# Patient Record
Sex: MALE | Race: WHITE | NOT HISPANIC OR LATINO | URBAN - METROPOLITAN AREA
[De-identification: names, ages, dates, MRNs, and addresses within clinical notes are randomized per-mention and may not be internally consistent; named-entity substitution may affect disease eponyms.]

---

## 2021-01-16 ENCOUNTER — INPATIENT (INPATIENT)
Facility: HOSPITAL | Age: 66
LOS: 0 days | Discharge: HOME | DRG: 314 | End: 2021-01-17
Attending: HOSPITALIST | Admitting: HOSPITALIST
Payer: MEDICARE

## 2021-01-16 VITALS
RESPIRATION RATE: 16 BRPM | WEIGHT: 272.05 LBS | SYSTOLIC BLOOD PRESSURE: 97 MMHG | TEMPERATURE: 98 F | HEIGHT: 72 IN | OXYGEN SATURATION: 97 % | HEART RATE: 55 BPM | DIASTOLIC BLOOD PRESSURE: 56 MMHG

## 2021-01-16 DIAGNOSIS — R00.1 BRADYCARDIA, UNSPECIFIED: ICD-10-CM

## 2021-01-16 LAB
ALBUMIN SERPL ELPH-MCNC: 3.9 G/DL — SIGNIFICANT CHANGE UP (ref 3.3–5.2)
ALP SERPL-CCNC: 32 U/L — LOW (ref 40–120)
ALT FLD-CCNC: 19 U/L — SIGNIFICANT CHANGE UP
ANION GAP SERPL CALC-SCNC: 16 MMOL/L — SIGNIFICANT CHANGE UP (ref 5–17)
AST SERPL-CCNC: 20 U/L — SIGNIFICANT CHANGE UP
BASOPHILS # BLD AUTO: 0.05 K/UL — SIGNIFICANT CHANGE UP (ref 0–0.2)
BASOPHILS NFR BLD AUTO: 0.7 % — SIGNIFICANT CHANGE UP (ref 0–2)
BILIRUB SERPL-MCNC: <0.2 MG/DL — LOW (ref 0.4–2)
BLD GP AB SCN SERPL QL: SIGNIFICANT CHANGE UP
BUN SERPL-MCNC: 157 MG/DL — HIGH (ref 8–20)
CALCIUM SERPL-MCNC: 8.5 MG/DL — LOW (ref 8.6–10.2)
CHLORIDE SERPL-SCNC: 98 MMOL/L — SIGNIFICANT CHANGE UP (ref 98–107)
CO2 SERPL-SCNC: 20 MMOL/L — LOW (ref 22–29)
CREAT SERPL-MCNC: 7.01 MG/DL — HIGH (ref 0.5–1.3)
EOSINOPHIL # BLD AUTO: 0.31 K/UL — SIGNIFICANT CHANGE UP (ref 0–0.5)
EOSINOPHIL NFR BLD AUTO: 4 % — SIGNIFICANT CHANGE UP (ref 0–6)
GLUCOSE BLDC GLUCOMTR-MCNC: 195 MG/DL — HIGH (ref 70–99)
GLUCOSE SERPL-MCNC: 245 MG/DL — HIGH (ref 70–99)
HCT VFR BLD CALC: 21.5 % — LOW (ref 39–50)
HGB BLD-MCNC: 6.8 G/DL — CRITICAL LOW (ref 13–17)
IMM GRANULOCYTES NFR BLD AUTO: 0.5 % — SIGNIFICANT CHANGE UP (ref 0–1.5)
LYMPHOCYTES # BLD AUTO: 0.68 K/UL — LOW (ref 1–3.3)
LYMPHOCYTES # BLD AUTO: 8.9 % — LOW (ref 13–44)
MCHC RBC-ENTMCNC: 27 PG — SIGNIFICANT CHANGE UP (ref 27–34)
MCHC RBC-ENTMCNC: 31.6 GM/DL — LOW (ref 32–36)
MCV RBC AUTO: 85.3 FL — SIGNIFICANT CHANGE UP (ref 80–100)
MONOCYTES # BLD AUTO: 0.44 K/UL — SIGNIFICANT CHANGE UP (ref 0–0.9)
MONOCYTES NFR BLD AUTO: 5.7 % — SIGNIFICANT CHANGE UP (ref 2–14)
NEUTROPHILS # BLD AUTO: 6.14 K/UL — SIGNIFICANT CHANGE UP (ref 1.8–7.4)
NEUTROPHILS NFR BLD AUTO: 80.2 % — HIGH (ref 43–77)
NT-PROBNP SERPL-SCNC: 599 PG/ML — HIGH (ref 0–300)
PLATELET # BLD AUTO: 257 K/UL — SIGNIFICANT CHANGE UP (ref 150–400)
POTASSIUM SERPL-MCNC: 5.2 MMOL/L — SIGNIFICANT CHANGE UP (ref 3.5–5.3)
POTASSIUM SERPL-SCNC: 5.2 MMOL/L — SIGNIFICANT CHANGE UP (ref 3.5–5.3)
PROT SERPL-MCNC: 6.1 G/DL — LOW (ref 6.6–8.7)
RBC # BLD: 2.52 M/UL — LOW (ref 4.2–5.8)
RBC # FLD: 14.9 % — HIGH (ref 10.3–14.5)
SODIUM SERPL-SCNC: 134 MMOL/L — LOW (ref 135–145)
TROPONIN T SERPL-MCNC: 0.14 NG/ML — HIGH (ref 0–0.06)
TROPONIN T SERPL-MCNC: 0.15 NG/ML — HIGH (ref 0–0.06)
WBC # BLD: 7.66 K/UL — SIGNIFICANT CHANGE UP (ref 3.8–10.5)
WBC # FLD AUTO: 7.66 K/UL — SIGNIFICANT CHANGE UP (ref 3.8–10.5)

## 2021-01-16 PROCEDURE — 99223 1ST HOSP IP/OBS HIGH 75: CPT

## 2021-01-16 PROCEDURE — 93010 ELECTROCARDIOGRAM REPORT: CPT

## 2021-01-16 PROCEDURE — 71045 X-RAY EXAM CHEST 1 VIEW: CPT | Mod: 26

## 2021-01-16 PROCEDURE — 99285 EMERGENCY DEPT VISIT HI MDM: CPT

## 2021-01-16 RX ORDER — GLUCAGON INJECTION, SOLUTION 0.5 MG/.1ML
1 INJECTION, SOLUTION SUBCUTANEOUS ONCE
Refills: 0 | Status: DISCONTINUED | OUTPATIENT
Start: 2021-01-16 | End: 2021-01-17

## 2021-01-16 RX ORDER — DEXTROSE 50 % IN WATER 50 %
15 SYRINGE (ML) INTRAVENOUS ONCE
Refills: 0 | Status: DISCONTINUED | OUTPATIENT
Start: 2021-01-16 | End: 2021-01-17

## 2021-01-16 RX ORDER — DEXTROSE 50 % IN WATER 50 %
12.5 SYRINGE (ML) INTRAVENOUS ONCE
Refills: 0 | Status: DISCONTINUED | OUTPATIENT
Start: 2021-01-16 | End: 2021-01-17

## 2021-01-16 RX ORDER — ASPIRIN/CALCIUM CARB/MAGNESIUM 324 MG
81 TABLET ORAL DAILY
Refills: 0 | Status: DISCONTINUED | OUTPATIENT
Start: 2021-01-16 | End: 2021-01-17

## 2021-01-16 RX ORDER — ATORVASTATIN CALCIUM 80 MG/1
1 TABLET, FILM COATED ORAL
Qty: 0 | Refills: 0 | DISCHARGE

## 2021-01-16 RX ORDER — SODIUM CHLORIDE 9 MG/ML
1000 INJECTION, SOLUTION INTRAVENOUS
Refills: 0 | Status: DISCONTINUED | OUTPATIENT
Start: 2021-01-16 | End: 2021-01-17

## 2021-01-16 RX ORDER — DEXTROSE 50 % IN WATER 50 %
25 SYRINGE (ML) INTRAVENOUS ONCE
Refills: 0 | Status: DISCONTINUED | OUTPATIENT
Start: 2021-01-16 | End: 2021-01-17

## 2021-01-16 RX ORDER — ICOSAPENT ETHYL 500 MG/1
2 CAPSULE, LIQUID FILLED ORAL
Qty: 0 | Refills: 0 | DISCHARGE

## 2021-01-16 RX ORDER — ALLOPURINOL 300 MG
1 TABLET ORAL
Qty: 0 | Refills: 0 | DISCHARGE

## 2021-01-16 RX ORDER — DOXAZOSIN MESYLATE 4 MG
1 TABLET ORAL
Qty: 0 | Refills: 0 | DISCHARGE

## 2021-01-16 RX ORDER — ASPIRIN/CALCIUM CARB/MAGNESIUM 324 MG
1 TABLET ORAL
Qty: 0 | Refills: 0 | DISCHARGE

## 2021-01-16 RX ORDER — NIFEDIPINE 30 MG
1 TABLET, EXTENDED RELEASE 24 HR ORAL
Qty: 0 | Refills: 0 | DISCHARGE

## 2021-01-16 RX ORDER — SEMAGLUTIDE 0.68 MG/ML
0 INJECTION, SOLUTION SUBCUTANEOUS
Qty: 0 | Refills: 0 | DISCHARGE

## 2021-01-16 RX ORDER — FUROSEMIDE 40 MG
2 TABLET ORAL
Qty: 0 | Refills: 0 | DISCHARGE

## 2021-01-16 RX ORDER — ALLOPURINOL 300 MG
100 TABLET ORAL DAILY
Refills: 0 | Status: DISCONTINUED | OUTPATIENT
Start: 2021-01-16 | End: 2021-01-17

## 2021-01-16 RX ORDER — ATORVASTATIN CALCIUM 80 MG/1
10 TABLET, FILM COATED ORAL AT BEDTIME
Refills: 0 | Status: DISCONTINUED | OUTPATIENT
Start: 2021-01-16 | End: 2021-01-17

## 2021-01-16 RX ORDER — INSULIN LISPRO 100/ML
1 VIAL (ML) SUBCUTANEOUS
Refills: 0 | Status: DISCONTINUED | OUTPATIENT
Start: 2021-01-16 | End: 2021-01-17

## 2021-01-16 RX ADMIN — ATORVASTATIN CALCIUM 10 MILLIGRAM(S): 80 TABLET, FILM COATED ORAL at 22:13

## 2021-01-16 RX ADMIN — Medication 81 MILLIGRAM(S): at 17:24

## 2021-01-16 NOTE — ED PROVIDER NOTE - OBJECTIVE STATEMENT
Patient is a 66yo M presenting BIBA with lightheadedness, weakness, loss of balance, and near syncope. He reports that he woke up this morning feeling normal and about 930am he began to feel these symptoms. Patient states that he still feels weak and lightheaded. He denies any chest pain or palpitations. He states that he is due for a kidney transplant due to diabetic nephropathy. He reports that he follows with [facility] for his medical care, has not had a stress, echo, or cath in several years but is supposed to get them done before his transplant. He denies any f/c/n/v/cp/sob.

## 2021-01-16 NOTE — CONSULT NOTE ADULT - SUBJECTIVE AND OBJECTIVE BOX
Very pleasant 65 year old male patient with a known history of HTN, DM, obesity HLD and CKD nearing dialysis on and kidney transplant list who arrived via EMS with lightheadedness and near syncope. The patient was at a diner with his wife when she reports he didn't look himself. He was stumbling and not paying attention during breakfast. She reports that he has been his normal self up until today. The patient reports fatigue and near syncope. Has never experienced syncope before in his lifetime. He does admit that his pulse, "is always low." No recent changes in his medications. No recent cardiac work up. Reports he was due to perform an Echo and stress test for his kidney transplant.   EKG in ER concerning for AV dissociation. Currently SR with rates in the 40s.     Cardiologist: Kd Chirinos [phone]  Endocrinologist: Ricardo Emmanuel [phone]  Nephrologist: Cody Mosqueda [phone]    PAST MEDICAL & SURGICAL HISTORY:  Hypertension, unspecified type  Type 2 diabetes mellitus without complication, unspecified whether long term insulin use    REVIEW OF SYSTEMS  General: - fever or chills, +fatigue  Skin/Breast: - rashes  Ophthalmologic: - blurred vision  ENMT: - sore throat  Respiratory and Thorax: - dyspnea, - cough	  Cardiovascular: - chest pain or palpitations  Gastrointestinal: - N/V/D/C  Genitourinary: - dysuria  Musculoskeletal:	 - arthritis  Neurological: - weaknesses  Psychiatric: - anxiety, - depression    MEDICATIONS  (STANDING):  Allopurinol 100mg daily  ASA 81mg daily  Atorvastatin 10mg daily  Coreg CR 20mg daily  Doxazosin 8mg daily  Fenofibrate 145mg daily  Lasix 80mg 2 tabs BID   Humalog 100U pump  Hydralazine 100mg TID  Iron MVI daily  Losartan 100mg daily  Metolazone 10mg every other day  MVI daily  Nifedipine ER 60mg BID  Ozempic 0.5mg weekly  Vascpea 1G 2 caps daily  Vitamin D2 1.25mb/50 weeky    Allergies  Actos (Unknown)    SOCIAL HISTORY: non smoker, non drinker    FAMILY HISTORY: no significant family history of arrhythmias     Vital Signs Last 24 Hrs  T(C): 36.7 (16 Jan 2021 10:23), Max: 36.7 (16 Jan 2021 10:23)  T(F): 98 (16 Jan 2021 10:23), Max: 98 (16 Jan 2021 10:23)  HR: 55 (16 Jan 2021 10:23) (55 - 55)  BP: 97/56 (16 Jan 2021 10:23) (97/56 - 97/56)  RR: 16 (16 Jan 2021 10:23) (16 - 16)  SpO2: 97% (16 Jan 2021 10:23) (97% - 97%)    Physical Exam:  Constitutional: AAOx3, NAD, somnolent  Neck: supple, No JVD  Cardiovascular: +S1S2 RRR, 2/6 ELA at LSB  Pulmonary: CTA b/l, unlabored, no wheezes, rales. No rhonchi  Abdomen: +BS, soft NTND  Extremities: no edema b/l,   Neuro: non focal, speech clear, HENDERSON x 4    LABS:                        6.8    7.66  )-----------( 257      ( 16 Jan 2021 11:37 )             21.5    RADIOLOGY & ADDITIONAL STUDIES:  CXR 1/16/21  FINDINGS:  No previous examinations are available for review.  The lungs are clear.  No pleural abnormality is seen.  The heart and mediastinum appear intact.  IMPRESSION: No focal consolidation is seen.    A/P  65 year old male patient with a known history of HTN, DM, obesity HLD and CKD nearing dialysis on and kidney transplant admitted with weakness. Noted to have anemia likely due to CKD and worsening renal function    EKG concerning for AV dissociation. EKG demonstrates iso-arrythmic dissociation with competing junctional escape    - NO EKG or telemetry evidence of CHB  - No need for permanent pacemaker at this time  - Obtain TTE  - Treat underlying causes - Anemia, worsening renal function  - Stop all AV ting blocking agents - home Coreg  - EP signing off      Very pleasant 65 year old male patient with a known history of HTN, DM, obesity HLD and CKD nearing dialysis on and kidney transplant list who arrived via EMS with lightheadedness and near syncope. The patient was at a diner with his wife when she reports he didn't look himself. He was stumbling and not paying attention during breakfast. She reports that he has been his normal self up until today. The patient reports fatigue and near syncope. Has never experienced syncope before in his lifetime. He does admit that his pulse, "is always low." No recent changes in his medications. No recent cardiac work up. Reports he was due to perform an Echo and stress test for his kidney transplant.   EKG in ER concerning for AV dissociation. Currently SR with rates in the 40s.     Cardiologist: Kd Chirinos [phone]  Endocrinologist: Ricardo Emmanuel [phone]  Nephrologist: Cody Mosqueda [phone]    PAST MEDICAL & SURGICAL HISTORY:  Hypertension, unspecified type  Type 2 diabetes mellitus without complication, unspecified whether long term insulin use    REVIEW OF SYSTEMS  General: - fever or chills, +fatigue  Skin/Breast: - rashes  Ophthalmologic: - blurred vision  ENMT: - sore throat  Respiratory and Thorax: - dyspnea, - cough	  Cardiovascular: - chest pain or palpitations  Gastrointestinal: - N/V/D/C  Genitourinary: - dysuria  Musculoskeletal:	 - arthritis  Neurological: - weaknesses  Psychiatric: - anxiety, - depression    MEDICATIONS  (STANDING):  Allopurinol 100mg daily  ASA 81mg daily  Atorvastatin 10mg daily  Coreg CR 20mg daily  Doxazosin 8mg daily  Fenofibrate 145mg daily  Lasix 80mg 2 tabs BID   Humalog 100U pump  Hydralazine 100mg TID  Iron MVI daily  Losartan 100mg daily  Metolazone 10mg every other day  MVI daily  Nifedipine ER 60mg BID  Ozempic 0.5mg weekly  Vascpea 1G 2 caps daily  Vitamin D2 1.25mb/50 weeky    Allergies  Actos (Unknown)    SOCIAL HISTORY: non smoker, non drinker    FAMILY HISTORY: no significant family history of arrhythmias     Vital Signs Last 24 Hrs  T(C): 36.7 (16 Jan 2021 10:23), Max: 36.7 (16 Jan 2021 10:23)  T(F): 98 (16 Jan 2021 10:23), Max: 98 (16 Jan 2021 10:23)  HR: 55 (16 Jan 2021 10:23) (55 - 55)  BP: 97/56 (16 Jan 2021 10:23) (97/56 - 97/56)  RR: 16 (16 Jan 2021 10:23) (16 - 16)  SpO2: 97% (16 Jan 2021 10:23) (97% - 97%)    Physical Exam:  Constitutional: AAOx3, NAD, somnolent  Neck: supple, No JVD  Cardiovascular: +S1S2 RRR, 2/6 ELA at LSB  Pulmonary: CTA b/l, unlabored, no wheezes, rales. No rhonchi  Abdomen: +BS, soft NTND  Extremities: no edema b/l,   Neuro: non focal, speech clear, HENDERSON x 4    LABS:                        6.8    7.66  )-----------( 257      ( 16 Jan 2021 11:37 )             21.5    RADIOLOGY & ADDITIONAL STUDIES:  CXR 1/16/21  FINDINGS:  No previous examinations are available for review.  The lungs are clear.  No pleural abnormality is seen.  The heart and mediastinum appear intact.  IMPRESSION: No focal consolidation is seen.    A/P  65 year old male patient with a known history of HTN, DM, obesity HLD and CKD nearing dialysis on and kidney transplant admitted with weakness. Noted to have anemia likely due to CKD and worsening renal function    EKG concerning for AV dissociation. EKG demonstrates iso-arrythmic dissociation with competing junctional escape and LBBB     - NO EKG or telemetry evidence of CHB  - No need for permanent pacemaker at this time  - Obtain TTE  - Treat underlying causes - Anemia, worsening renal function  - Stop all AV ting blocking agents - home Coreg

## 2021-01-16 NOTE — ED PROVIDER NOTE - PROGRESS NOTE DETAILS
Cheyanne Atkisn ([phone]) Spoke to patient's wife. States that patient has been in good health up until today. She states that he appeared to be stumbling and zoning out while they were eating at a restaurant. They thought his sugar was low so they gave him OJ. She states that he was just put on the transplant list last week.

## 2021-01-16 NOTE — ED ADULT NURSE NOTE - OBJECTIVE STATEMENT
pt alert and oriented x4 came in c/o weakness. pt reports low hgb but unable to get infusion due to on list for kidney transplant. pt currently not on dialysis, makes urine. RR even un labored. pt flushed pallor, bradycardic 42. pt educated on plan of care, pt able to successfully teach back plan of care to RN, RN will continue to reeducate pt during hospital stay.

## 2021-01-16 NOTE — H&P ADULT - NSHPSOCIALHISTORY_GEN_ALL_CORE
Denied tobacco, alcohol, or illicit drug use    Lives at home with his wife Denied tobacco, alcohol, or illicit drug use    Lives at home with his wife    Additional PMH: Chronic kidney disease

## 2021-01-16 NOTE — ED ADULT TRIAGE NOTE - CHIEF COMPLAINT QUOTE
Pt with hx of heart block states received a 6.2 HGB result 2 days ago and due to being on kidney transplant list, did not want to transfuse and was setting pt up with procrit injection that he has not yet had. Pt feeling dizzy and generalized weakness, pt denies CP/SOB.

## 2021-01-16 NOTE — ED PROVIDER NOTE - PHYSICAL EXAMINATION
General: Tired appearing male in no acute distress  HEENT: Normocephalic, atraumatic. Moist mucous membranes.   Eyes: No scleral icterus. EOMI. ADELA.  Neck: Soft and supple. Full ROM without pain. No midline tenderness  Cardiac: Slow rate and regular rhythm. No murmurs. No LE edema.  Resp: Lungs CTAB. No wheezes, rales or rhonchi.  Abd: Diabetic pump in place. Soft, non-tender, non-distended. No scars, masses, or lesions.  Back: Spine midline and non-tender. No CVA tenderness.    Skin: No rashes, abrasions, or lacerations.  Neuro: AO x 3. Motor strength and sensation grossly intact.

## 2021-01-16 NOTE — H&P ADULT - NSHPPHYSICALEXAM_GEN_ALL_CORE
Vital Signs Last 24 Hrs  T(C): 36.7 (16 Jan 2021 10:23), Max: 36.7 (16 Jan 2021 10:23)  T(F): 98 (16 Jan 2021 10:23), Max: 98 (16 Jan 2021 10:23)  HR: 55 (16 Jan 2021 10:23) (55 - 55)  BP: 97/56 (16 Jan 2021 10:23) (97/56 - 97/56)  BP(mean): --  RR: 16 (16 Jan 2021 10:23) (16 - 16)  SpO2: 97% (16 Jan 2021 10:23) (97% - 97%)    General appearance: No acute distress, Awake, Alert  HEENT: Normocephalic, Atraumatic, Conjunctiva clear, EOMI  Neck: Supple, No JVD, No tenderness  Lungs: Breath sound equal bilaterally, No wheezes, No rales  Cardiovascular: S1S2, Regular rhythm  Abdomen: Soft, Nontender, Nondistended, No guarding/rebound, Positive bowel sounds  Extremities: No clubbing, No cyanosis, No edema, No calf tenderness  Neuro: Strength equal bilaterally, No tremors  Psychiatric: Appropriate mood, Normal affect

## 2021-01-16 NOTE — ED PROVIDER NOTE - NS ED ROS FT
General: Denies fever, chills  HEENT: Denies sensory changes, sore throat  Neck: Denies neck pain, neck stiffness  Resp: Denies coughing, SOB  Cardiovascular: Denies cp, palpitations.   GI: Denies abdominal pain, nausea, vomiting, diarrhea  : Denies dysuria, hematuria, incontinence  MSK: Denies back pain  Neuro: Endorses near syncope, lightheadedness, weakness.   Skin: Denies rashes

## 2021-01-16 NOTE — ED ADULT NURSE REASSESSMENT NOTE - NS ED NURSE REASSESS COMMENT FT1
pt resting in bed on cardiac monitor. RR even unlabored. pt has insulin drip running. pt denies pain. HR 65. pt educated on plan of care, pt able to successfully teach back plan of care to RN, RN will continue to reeducate pt during hospital stay.

## 2021-01-16 NOTE — CONSULT NOTE ADULT - ATTENDING COMMENTS
Seen and examined,     1. Sudden presyncope, resolved. Unclear cause as was not on monitoring  - Suggest to follow up on TTE  - Monitor for 24 hours on Tele    2. Sinus bradycardia with iso-rhythmic dissociation (sinus bradycardia with competing junctional esacape), but also LBBB while on coreg   - stop coref  - monitor on tele  - r/a rate and rhythm tomorrow    3. LBBB, monitor for now      if bradycardia and symptoms improve off the coreg, will consider DC home tomorrow with [facility]/ILR as an outpatient if needed  above d/w patient and ER team

## 2021-01-16 NOTE — H&P ADULT - ASSESSMENT
65M with a history of  65M with a history of diabetes, chronic kidney disease awaiting initiation of peritoneal dialysis and possible kidney transplant, anemia, and hypertension who presented with an episode of near-syncope found to have bradycardia and hypotension.    Near syncope - Suspected to be multifactorial with bradycardia, hypotension, and anemia. Electrophysiology consultation noted with recommendations to continue on telemetry monitoring and to pursue echocardiogram. TSH was within normal levels. To hold carvedilol for now.    Hypotension - The patient is noted to be on multiple antihypertensive medications (carvedilol, hydralazine, furosemide, doxazosin, and nifedipine) which is to be held for the now given the presentation of hypotension.    Anemia - To continue monitoring CBC. The patient was noted to have significant anemia. This was explained to the patient who reported that his most recent hemoglobin was 6.2. The patient reported that he had received an injection or erythropoietin two days prior and did not wish to pursue transfusion of packed red blood cells.    Chronic kidney disease - BUN/Cr values comparable to the patient's most recent laboratory studies. To follow up with his nephrologist for further management and kidney transplant. 65M with a history of diabetes, chronic kidney disease awaiting initiation of peritoneal dialysis and possible kidney transplant, anemia, and hypertension who presented with an episode of near-syncope found to have bradycardia and hypotension.    Near syncope - Suspected to be multifactorial with bradycardia, hypotension, and anemia. Electrophysiology consultation noted with recommendations to continue on telemetry monitoring and to pursue echocardiogram. TSH was within normal levels. To hold carvedilol for now.    Hypotension - The patient is noted to be on multiple antihypertensive medications (carvedilol, hydralazine, furosemide, doxazosin, and nifedipine) which is to be held for the now given the presentation of hypotension.    Anemia - To continue monitoring CBC. The patient was noted to have significant anemia. This was explained to the patient who reported that his most recent hemoglobin was 6.2. The patient reported that he had received an injection or erythropoietin two days prior and did not wish to pursue transfusion of packed red blood cells.    Chronic kidney disease - BUN/Cr values comparable to the patient's most recent laboratory studies. To follow up with his nephrologist for further management and kidney transplant.    Diabetes - To continue the use of the patient's insulin pump. Glucose monitoring.

## 2021-01-16 NOTE — H&P ADULT - HISTORY OF PRESENT ILLNESS
65M who presented with near syncope. 65M who presented with near syncope. The patient was eating breakfast with his wife when she noted that he had difficulty paying attention and was stumbling when he was walking. The patient reported feeling lightheaded but did not lose consciousness. The symptoms persisted and the patient was brought to the hospital for evaluation. Upon presentation, the patient was noted to have bradycardia and hypotension. The patient reported similar episodes of lightheadedness in the past but denied any history of syncope. He denied any associated chest pain, palpitations, or orthopnea. He is unaware of any aggravating or relieving factors. He denied any recent fevers, chills, dyspnea, cough, or sick contacts.

## 2021-01-16 NOTE — ED PROVIDER NOTE - CLINICAL SUMMARY MEDICAL DECISION MAKING FREE TEXT BOX
Patient presenting with bradycardia and a near syncopal episode. Patient presenting with ESRD and bradycardia who had a near syncopal episode. Evaluation revealed anemia to 6.8 (improved from reported 6.2 two days ago). Cardiology EP following. Patient remains comfortable with relative bradycardia. Labwork unremarkable.

## 2021-01-16 NOTE — ED ADULT NURSE NOTE - PMH
Hypertension, unspecified type    Type 2 diabetes mellitus without complication, unspecified whether long term insulin use

## 2021-01-16 NOTE — ED PROVIDER NOTE - ATTENDING CONTRIBUTION TO CARE
I performed a face to face history and physical exam of the patient and discussed their management with the student/resident/ACP. I reviewed the student/resident/ACP's note and agree with the documented findings and plan of care.    Pt states that this morning he woke up and initially felt well. Pt states that he suddenly had an episode of lightheadedness/dizziness and felt like he was going to pass out. Pt states that he feels weak now.      physical - laurita regular. ctab. abd - soft, nt. no edema. no rash.    plan - labs and imaging reviewed.  Pt with near syncope and bradycardia. EP evaluated. will admit for further management.

## 2021-01-16 NOTE — H&P ADULT - NSICDXPASTMEDICALHX_GEN_ALL_CORE_FT
PAST MEDICAL HISTORY:  Hypertension, unspecified type     Type 2 diabetes mellitus without complication, unspecified whether long term insulin use

## 2021-01-17 ENCOUNTER — TRANSCRIPTION ENCOUNTER (OUTPATIENT)
Age: 66
End: 2021-01-17

## 2021-01-17 VITALS
SYSTOLIC BLOOD PRESSURE: 152 MMHG | HEART RATE: 62 BPM | RESPIRATION RATE: 18 BRPM | DIASTOLIC BLOOD PRESSURE: 58 MMHG | OXYGEN SATURATION: 100 % | TEMPERATURE: 98 F

## 2021-01-17 LAB
A1C WITH ESTIMATED AVERAGE GLUCOSE RESULT: 5.5 % — SIGNIFICANT CHANGE UP (ref 4–5.6)
ANION GAP SERPL CALC-SCNC: 18 MMOL/L — HIGH (ref 5–17)
BUN SERPL-MCNC: 148 MG/DL — HIGH (ref 8–20)
CALCIUM SERPL-MCNC: 8.8 MG/DL — SIGNIFICANT CHANGE UP (ref 8.6–10.2)
CHLORIDE SERPL-SCNC: 100 MMOL/L — SIGNIFICANT CHANGE UP (ref 98–107)
CO2 SERPL-SCNC: 20 MMOL/L — LOW (ref 22–29)
CREAT SERPL-MCNC: 6.64 MG/DL — HIGH (ref 0.5–1.3)
ESTIMATED AVERAGE GLUCOSE: 111 MG/DL — SIGNIFICANT CHANGE UP (ref 68–114)
GLUCOSE BLDC GLUCOMTR-MCNC: 111 MG/DL — HIGH (ref 70–99)
GLUCOSE SERPL-MCNC: 82 MG/DL — SIGNIFICANT CHANGE UP (ref 70–99)
HCT VFR BLD CALC: 22.2 % — LOW (ref 39–50)
HCV AB S/CO SERPL IA: 0.55 S/CO — SIGNIFICANT CHANGE UP (ref 0–0.99)
HCV AB SERPL-IMP: SIGNIFICANT CHANGE UP
HGB BLD-MCNC: 7.1 G/DL — LOW (ref 13–17)
MCHC RBC-ENTMCNC: 26.8 PG — LOW (ref 27–34)
MCHC RBC-ENTMCNC: 32 GM/DL — SIGNIFICANT CHANGE UP (ref 32–36)
MCV RBC AUTO: 83.8 FL — SIGNIFICANT CHANGE UP (ref 80–100)
PLATELET # BLD AUTO: 281 K/UL — SIGNIFICANT CHANGE UP (ref 150–400)
POTASSIUM SERPL-MCNC: 4 MMOL/L — SIGNIFICANT CHANGE UP (ref 3.5–5.3)
POTASSIUM SERPL-SCNC: 4 MMOL/L — SIGNIFICANT CHANGE UP (ref 3.5–5.3)
RBC # BLD: 2.65 M/UL — LOW (ref 4.2–5.8)
RBC # FLD: 14.9 % — HIGH (ref 10.3–14.5)
SARS-COV-2 IGG SERPL QL IA: NEGATIVE — SIGNIFICANT CHANGE UP
SARS-COV-2 IGM SERPL IA-ACNC: 0.07 INDEX — SIGNIFICANT CHANGE UP
SARS-COV-2 RNA SPEC QL NAA+PROBE: SIGNIFICANT CHANGE UP
SODIUM SERPL-SCNC: 138 MMOL/L — SIGNIFICANT CHANGE UP (ref 135–145)
WBC # BLD: 9.25 K/UL — SIGNIFICANT CHANGE UP (ref 3.8–10.5)
WBC # FLD AUTO: 9.25 K/UL — SIGNIFICANT CHANGE UP (ref 3.8–10.5)

## 2021-01-17 PROCEDURE — 83880 ASSAY OF NATRIURETIC PEPTIDE: CPT

## 2021-01-17 PROCEDURE — 86900 BLOOD TYPING SEROLOGIC ABO: CPT

## 2021-01-17 PROCEDURE — 36415 COLL VENOUS BLD VENIPUNCTURE: CPT

## 2021-01-17 PROCEDURE — 84100 ASSAY OF PHOSPHORUS: CPT

## 2021-01-17 PROCEDURE — 84484 ASSAY OF TROPONIN QUANT: CPT

## 2021-01-17 PROCEDURE — 80048 BASIC METABOLIC PNL TOTAL CA: CPT

## 2021-01-17 PROCEDURE — U0005: CPT

## 2021-01-17 PROCEDURE — 86901 BLOOD TYPING SEROLOGIC RH(D): CPT

## 2021-01-17 PROCEDURE — 84443 ASSAY THYROID STIM HORMONE: CPT

## 2021-01-17 PROCEDURE — 85027 COMPLETE CBC AUTOMATED: CPT

## 2021-01-17 PROCEDURE — 80053 COMPREHEN METABOLIC PANEL: CPT

## 2021-01-17 PROCEDURE — 86769 SARS-COV-2 COVID-19 ANTIBODY: CPT

## 2021-01-17 PROCEDURE — 71045 X-RAY EXAM CHEST 1 VIEW: CPT

## 2021-01-17 PROCEDURE — 83735 ASSAY OF MAGNESIUM: CPT

## 2021-01-17 PROCEDURE — 99239 HOSP IP/OBS DSCHRG MGMT >30: CPT

## 2021-01-17 PROCEDURE — 82962 GLUCOSE BLOOD TEST: CPT

## 2021-01-17 PROCEDURE — 86850 RBC ANTIBODY SCREEN: CPT

## 2021-01-17 PROCEDURE — C8929: CPT

## 2021-01-17 PROCEDURE — 83036 HEMOGLOBIN GLYCOSYLATED A1C: CPT

## 2021-01-17 PROCEDURE — G0378: CPT

## 2021-01-17 PROCEDURE — 99285 EMERGENCY DEPT VISIT HI MDM: CPT

## 2021-01-17 PROCEDURE — 86803 HEPATITIS C AB TEST: CPT

## 2021-01-17 PROCEDURE — 99231 SBSQ HOSP IP/OBS SF/LOW 25: CPT

## 2021-01-17 PROCEDURE — 93005 ELECTROCARDIOGRAM TRACING: CPT

## 2021-01-17 PROCEDURE — 85025 COMPLETE CBC W/AUTO DIFF WBC: CPT

## 2021-01-17 PROCEDURE — U0003: CPT

## 2021-01-17 RX ORDER — HYDRALAZINE HCL 50 MG
1 TABLET ORAL
Qty: 90 | Refills: 0
Start: 2021-01-17 | End: 2021-02-15

## 2021-01-17 RX ORDER — HYDRALAZINE HCL 50 MG
1 TABLET ORAL
Qty: 0 | Refills: 0 | DISCHARGE

## 2021-01-17 RX ORDER — CARVEDILOL PHOSPHATE 80 MG/1
1 CAPSULE, EXTENDED RELEASE ORAL
Qty: 0 | Refills: 0 | DISCHARGE

## 2021-01-17 NOTE — PROGRESS NOTE ADULT - SUBJECTIVE AND OBJECTIVE BOX
Very pleasant 65 year old male patient with a known history of HTN, DM, obesity HLD and CKD nearing dialysis on and kidney transplant list who arrived via EMS with lightheadedness and near syncope. The patient was at a diner with his wife when she reports he didn't look himself. He was stumbling and not paying attention during breakfast. She reports that he has been his normal self up until today. The patient reports fatigue and near syncope. Has never experienced syncope before in his lifetime. He does admit that his pulse, "is always low." No recent changes in his medications. No recent cardiac work up. Reports he was due to perform an Echo and stress test for his kidney transplant.   EKG in ER concerning for AV dissociation. Currently SR with rates in the 40s.     Cardiologist: Kd Chirinos [phone]  Endocrinologist: Ricardo Emmanuel [phone]  Nephrologist: Cody Mosqueda [phone]        Interval Hx:  Dizziness resolved while holding coreg. Tele shows HR in the 60s. TTE unremarkable     PAST MEDICAL & SURGICAL HISTORY:  Hypertension, unspecified type  Type 2 diabetes mellitus without complication, unspecified whether long term insulin use    REVIEW OF SYSTEMS  General: - fever or chills, +fatigue  Skin/Breast: - rashes  Ophthalmologic: - blurred vision  ENMT: - sore throat  Respiratory and Thorax: - dyspnea, - cough	  Cardiovascular: - chest pain or palpitations  Gastrointestinal: - N/V/D/C  Genitourinary: - dysuria  Musculoskeletal:	 - arthritis  Neurological: - weaknesses  Psychiatric: - anxiety, - depression    MEDICATIONS  (STANDING):  allopurinol 100 milliGRAM(s) Oral daily  aspirin enteric coated 81 milliGRAM(s) Oral daily  atorvastatin 10 milliGRAM(s) Oral at bedtime  dextrose 40% Gel 15 Gram(s) Oral once  dextrose 5%. 1000 milliLiter(s) (50 mL/Hr) IV Continuous <Continuous>  dextrose 5%. 1000 milliLiter(s) (100 mL/Hr) IV Continuous <Continuous>  dextrose 50% Injectable 25 Gram(s) IV Push once  dextrose 50% Injectable 12.5 Gram(s) IV Push once  dextrose 50% Injectable 25 Gram(s) IV Push once  glucagon  Injectable 1 milliGRAM(s) IntraMuscular once  insulin lispro (HumaLOG) Pump 1 Each SubCutaneous Continuous Pump          Allergies  Actos (Unknown)    SOCIAL HISTORY: non smoker, non drinker    FAMILY HISTORY: no significant family history of arrhythmias     Vital Signs Last 24 Hrs  T(C): 36.8 (17 Jan 2021 09:28), Max: 36.8 (17 Jan 2021 09:28)  T(F): 98.3 (17 Jan 2021 09:28), Max: 98.3 (17 Jan 2021 09:28)  HR: 62 (17 Jan 2021 09:28) (48 - 70)  BP: 152/58 (17 Jan 2021 09:28) (105/72 - 158/61)  BP(mean): --  RR: 18 (17 Jan 2021 09:28) (16 - 18)  SpO2: 100% (17 Jan 2021 09:28) (97% - 100%)    Physical Exam:  Constitutional: AAOx3, NAD, somnolent  Neck: supple, No JVD  Cardiovascular: +S1S2 RRR, 2/6 ELA at LSB  Pulmonary: CTA b/l, unlabored, no wheezes, rales. No rhonchi  Abdomen: +BS, soft NTND  Extremities: no edema b/l,   Neuro: non focal, speech clear, HENDERSON x 4    LABS:                        7.1    9.25  )-----------( 281      ( 17 Jan 2021 04:21 )             22.2   01-17    138  |  100  |  148.0<H>  ----------------------------<  82  4.0   |  20.0<L>  |  6.64<H>    Ca    8.8      17 Jan 2021 04:21  Phos  7.8     01-16  Mg     3.0     01-16    TPro  6.1<L>  /  Alb  3.9  /  TBili  <0.2<L>  /  DBili  x   /  AST  20  /  ALT  19  /  AlkPhos  32<L>  01-16        RADIOLOGY & ADDITIONAL STUDIES:  CXR 1/16/21  FINDINGS:  No previous examinations are available for review.  The lungs are clear.  No pleural abnormality is seen.  The heart and mediastinum appear intact.  IMPRESSION: No focal consolidation is seen.    < from: TTE Echo Complete w/ Contrast w/ Doppler (01.16.21 @ 12:49) >    Summary:   1. Left ventricular ejection fraction, by visual estimation, is 55 to 60%.   2. Normal global left ventricular systolic function.   3. Spectral Doppler shows pseudonormal pattern of left ventricular myocardial filling (Grade IIdiastolic dysfunction).   4. There is moderate concentric left ventricular hypertrophy.   5. Normal right ventricular size and function.   6. Mildly enlarged left atrium.   7. Normal right atrial size.   8. Trace mitral valve regurgitation.   9. Sclerotic aortic valve with normal opening.  10. Estimated pulmonary artery systolic pressure is 39.7 mmHg assuming a right atrial pressure of 3 mmHg, which is consistent with borderline pulmonary hypertension.  11. Endocardial visualization was enhanced with intravenous echo contrast.    < end of copied text >

## 2021-01-17 NOTE — DISCHARGE NOTE PROVIDER - PROVIDER TOKENS
FREE:[LAST:[primarey],PHONE:[(   )    -],FAX:[(   )    -],ADDRESS:[[id number]]],PROVIDER:[TOKEN:[[id number]]]

## 2021-01-17 NOTE — DISCHARGE NOTE PROVIDER - HOSPITAL COURSE
65M with a history of diabetes, chronic kidney disease awaiting initiation of peritoneal dialysis and possible kidney transplant, anemia, and hypertension who presented with an episode of near-syncope found to have bradycardia and hypotension. Patient admitted to tele and seen by ep     Near syncope - Suspected to be multifactorial with bradycardia, hypotension, and anemia. Electrophysiology consultation noted with recommendations to continue on telemetry monitoring and to pursue echocardiogram. TSH was within normal levels. dc without coreg     Hypotension - The patient is noted to be on multiple antihypertensive medications (carvedilol, hydralazine, furosemide, doxazosin, and nifedipine) which is to be held for the now given the presentation of hypotension.    Anemia - To continue monitoring CBC. The patient was noted to have significant anemia. This was explained to the patient who reported that his most recent hemoglobin was 6.2. The patient reported that he had received an injection or erythropoietin two days prior   refused transfusion     Chronic kidney disease - BUN/Cr values comparable to the patient's most recent laboratory studies. To follow up with his nephrologist for further management and kidney transplant.    Diabetes - To continue the use of the patient's insulin pump. Glucose monitoring.    time spent on dc 34 minutes    pe  General appearance: No acute distress, Awake, Alert  HEENT: Normocephalic, Atraumatic, Conjunctiva clear, EOMI  Neck: Supple, No JVD, No tenderness  Lungs: Breath sound equal bilaterally, No wheezes, No rales  Cardiovascular: S1S2, Regular rhythm  Abdomen: Soft, Nontender, Nondistended, No guarding/rebound, Positive bowel sounds  Extremities: No clubbing, No cyanosis, No edema, No calf tenderness  Neuro: Strength equal bilaterally, No tremors  Psychiatric: Appropriate mood, Normal affect

## 2021-01-17 NOTE — DISCHARGE NOTE PROVIDER - NSDCCPCAREPLAN_GEN_ALL_CORE_FT
PRINCIPAL DISCHARGE DIAGNOSIS  Diagnosis: Bradycardia  Assessment and Plan of Treatment:       SECONDARY DISCHARGE DIAGNOSES  Diagnosis: ESRD (end stage renal disease)  Assessment and Plan of Treatment:

## 2021-01-17 NOTE — DISCHARGE NOTE NURSING/CASE MANAGEMENT/SOCIAL WORK - PATIENT PORTAL LINK FT
You can access the FollowMyHealth Patient Portal offered by [facility] by registering at the following website: [email or web address]. By joining [employer]’s FollowMyHealth portal, you will also be able to view your health information using other applications (apps) compatible with our system.

## 2021-01-17 NOTE — DISCHARGE NOTE PROVIDER - CARE PROVIDER_API CALL
valerie qureshi  Phone: (   )    -  Fax: (   )    -  Follow Up Time:     Eloy Sen (MD)  Cardiac Electrophysiology; Cardiovascular Disease; Internal Medicine  [address] [place] [address]  Phone: [phone]  Fax: [phone]  Follow Up Time:

## 2021-01-17 NOTE — PROGRESS NOTE ADULT - ASSESSMENT
A/P  65 year old male patient with a known history of HTN, DM, obesity HLD and CKD nearing dialysis on and kidney transplant admitted with weakness. Noted to have anemia likely due to CKD and worsening renal function. EKG concerning for AV dissociation. EKG demonstrates iso-arrythmic dissociation with competing junctional escape and LBBB     Interval Hx:  Dizziness resolved while holding coreg. Tele shows HR in the 60s. TTE unremarkable     - NO EKG or telemetry evidence of CHB  - No need for permanent pacemaker at this time  - Treat underlying causes - Anemia, worsening renal function  - Stop all AV ting blocking agents - home Coreg  - Can go home off beta blocker, he will contact his cardiologist and arrange a follow up. I offered follow up if he could not reach his cardiologist. Suggested outpatient Holter or MCOT.     Will sign off for now, call us if needed

## 2021-01-17 NOTE — DISCHARGE NOTE PROVIDER - NSDCMRMEDTOKEN_GEN_ALL_CORE_FT
allopurinol 100 mg oral tablet: 1 tab(s) orally once a day  Aspirin Enteric Coated 81 mg oral delayed release tablet: 1 tab(s) orally once a day  atorvastatin 10 mg oral tablet: 1 tab(s) orally once a day  doxazosin 8 mg oral tablet: 1 tab(s) orally once a day (at bedtime)  furosemide 80 mg oral tablet: 2 tab(s) orally 2 times a day  metOLazone 10 mg oral tablet: 1 tab(s) orally every other day  NIFEdipine 60 mg oral tablet, extended release: 1 tab(s) orally once a day  Ozempic (0.25 mg or 0.5 mg dose) 2 mg/1.5 mL subcutaneous solution: subcutaneous once a week  Vascepa 1 g oral capsule: 2 cap(s) orally 2 times a day

## 2021-07-01 PROBLEM — E11.9 TYPE 2 DIABETES MELLITUS WITHOUT COMPLICATIONS: Chronic | Status: ACTIVE | Noted: 2021-01-16

## 2021-07-01 PROBLEM — I10 ESSENTIAL (PRIMARY) HYPERTENSION: Chronic | Status: ACTIVE | Noted: 2021-01-16

## 2021-10-05 ENCOUNTER — NON-APPOINTMENT (OUTPATIENT)
Age: 66
End: 2021-10-05

## 2021-10-05 ENCOUNTER — APPOINTMENT (OUTPATIENT)
Dept: OPHTHALMOLOGY | Facility: CLINIC | Age: 66
End: 2021-10-05
Payer: COMMERCIAL

## 2021-10-05 PROCEDURE — 92014 COMPRE OPH EXAM EST PT 1/>: CPT

## 2021-10-05 PROCEDURE — 92134 CPTRZ OPH DX IMG PST SGM RTA: CPT

## 2022-10-25 ENCOUNTER — APPOINTMENT (OUTPATIENT)
Dept: OPHTHALMOLOGY | Facility: CLINIC | Age: 67
End: 2022-10-25

## 2022-10-25 ENCOUNTER — NON-APPOINTMENT (OUTPATIENT)
Age: 67
End: 2022-10-25

## 2022-10-25 PROCEDURE — 92014 COMPRE OPH EXAM EST PT 1/>: CPT

## 2022-10-25 PROCEDURE — 92134 CPTRZ OPH DX IMG PST SGM RTA: CPT

## 2023-04-25 ENCOUNTER — NON-APPOINTMENT (OUTPATIENT)
Age: 68
End: 2023-04-25

## 2023-04-25 ENCOUNTER — APPOINTMENT (OUTPATIENT)
Dept: OPHTHALMOLOGY | Facility: CLINIC | Age: 68
End: 2023-04-25
Payer: COMMERCIAL

## 2023-04-25 PROCEDURE — 92014 COMPRE OPH EXAM EST PT 1/>: CPT

## 2023-04-25 PROCEDURE — 92250 FUNDUS PHOTOGRAPHY W/I&R: CPT

## 2023-10-24 ENCOUNTER — APPOINTMENT (OUTPATIENT)
Dept: OPHTHALMOLOGY | Facility: CLINIC | Age: 68
End: 2023-10-24
Payer: COMMERCIAL

## 2023-10-24 ENCOUNTER — NON-APPOINTMENT (OUTPATIENT)
Age: 68
End: 2023-10-24

## 2023-10-24 PROCEDURE — 99215 OFFICE O/P EST HI 40 MIN: CPT

## 2023-10-24 PROCEDURE — 92134 CPTRZ OPH DX IMG PST SGM RTA: CPT

## 2023-10-26 ENCOUNTER — APPOINTMENT (OUTPATIENT)
Dept: OPHTHALMOLOGY | Facility: CLINIC | Age: 68
End: 2023-10-26
Payer: COMMERCIAL

## 2023-10-26 ENCOUNTER — NON-APPOINTMENT (OUTPATIENT)
Age: 68
End: 2023-10-26

## 2023-10-26 PROCEDURE — 92014 COMPRE OPH EXAM EST PT 1/>: CPT

## 2023-11-02 ENCOUNTER — APPOINTMENT (OUTPATIENT)
Dept: OPHTHALMOLOGY | Facility: CLINIC | Age: 68
End: 2023-11-02
Payer: COMMERCIAL

## 2023-11-02 ENCOUNTER — NON-APPOINTMENT (OUTPATIENT)
Age: 68
End: 2023-11-02

## 2023-11-02 PROCEDURE — 67028 INJECTION EYE DRUG: CPT | Mod: RT

## 2023-11-09 ENCOUNTER — APPOINTMENT (OUTPATIENT)
Dept: OPHTHALMOLOGY | Facility: CLINIC | Age: 68
End: 2023-11-09
Payer: COMMERCIAL

## 2023-11-09 ENCOUNTER — NON-APPOINTMENT (OUTPATIENT)
Age: 68
End: 2023-11-09

## 2023-11-09 PROCEDURE — 99213 OFFICE O/P EST LOW 20 MIN: CPT

## 2023-11-30 ENCOUNTER — APPOINTMENT (OUTPATIENT)
Dept: OPHTHALMOLOGY | Facility: CLINIC | Age: 68
End: 2023-11-30
Payer: COMMERCIAL

## 2023-11-30 ENCOUNTER — NON-APPOINTMENT (OUTPATIENT)
Age: 68
End: 2023-11-30

## 2023-11-30 PROCEDURE — 92134 CPTRZ OPH DX IMG PST SGM RTA: CPT

## 2023-11-30 PROCEDURE — 92012 INTRM OPH EXAM EST PATIENT: CPT

## 2024-01-11 ENCOUNTER — APPOINTMENT (OUTPATIENT)
Dept: OPHTHALMOLOGY | Facility: CLINIC | Age: 69
End: 2024-01-11
Payer: COMMERCIAL

## 2024-01-11 ENCOUNTER — NON-APPOINTMENT (OUTPATIENT)
Age: 69
End: 2024-01-11

## 2024-01-11 PROCEDURE — 92134 CPTRZ OPH DX IMG PST SGM RTA: CPT

## 2024-01-11 PROCEDURE — 92012 INTRM OPH EXAM EST PATIENT: CPT

## 2024-01-25 ENCOUNTER — NON-APPOINTMENT (OUTPATIENT)
Age: 69
End: 2024-01-25

## 2024-01-25 ENCOUNTER — APPOINTMENT (OUTPATIENT)
Dept: OPHTHALMOLOGY | Facility: CLINIC | Age: 69
End: 2024-01-25
Payer: COMMERCIAL

## 2024-01-25 PROCEDURE — 67210 TREATMENT OF RETINAL LESION: CPT | Mod: LT

## 2024-02-08 ENCOUNTER — APPOINTMENT (OUTPATIENT)
Dept: OPHTHALMOLOGY | Facility: CLINIC | Age: 69
End: 2024-02-08
Payer: COMMERCIAL

## 2024-02-08 ENCOUNTER — NON-APPOINTMENT (OUTPATIENT)
Age: 69
End: 2024-02-08

## 2024-02-08 PROCEDURE — 92014 COMPRE OPH EXAM EST PT 1/>: CPT | Mod: 24

## 2024-02-08 PROCEDURE — 92134 CPTRZ OPH DX IMG PST SGM RTA: CPT

## 2024-05-02 ENCOUNTER — NON-APPOINTMENT (OUTPATIENT)
Age: 69
End: 2024-05-02

## 2024-05-02 ENCOUNTER — APPOINTMENT (OUTPATIENT)
Dept: OPHTHALMOLOGY | Facility: CLINIC | Age: 69
End: 2024-05-02
Payer: COMMERCIAL

## 2024-05-02 PROCEDURE — 99213 OFFICE O/P EST LOW 20 MIN: CPT

## 2024-05-02 PROCEDURE — 92134 CPTRZ OPH DX IMG PST SGM RTA: CPT

## 2024-08-29 ENCOUNTER — NON-APPOINTMENT (OUTPATIENT)
Age: 69
End: 2024-08-29

## 2024-08-29 ENCOUNTER — APPOINTMENT (OUTPATIENT)
Dept: OPHTHALMOLOGY | Facility: CLINIC | Age: 69
End: 2024-08-29
Payer: COMMERCIAL

## 2024-08-29 PROCEDURE — 92134 CPTRZ OPH DX IMG PST SGM RTA: CPT

## 2024-08-29 PROCEDURE — 92014 COMPRE OPH EXAM EST PT 1/>: CPT

## 2024-11-25 ENCOUNTER — APPOINTMENT (OUTPATIENT)
Dept: OPHTHALMOLOGY | Facility: CLINIC | Age: 69
End: 2024-11-25
Payer: COMMERCIAL

## 2024-11-25 ENCOUNTER — NON-APPOINTMENT (OUTPATIENT)
Age: 69
End: 2024-11-25

## 2024-11-25 ENCOUNTER — TRANSCRIPTION ENCOUNTER (OUTPATIENT)
Age: 69
End: 2024-11-25

## 2024-11-25 PROCEDURE — 66821 AFTER CATARACT LASER SURGERY: CPT | Mod: LT

## 2024-11-25 PROCEDURE — 92014 COMPRE OPH EXAM EST PT 1/>: CPT | Mod: 25

## 2024-11-25 PROCEDURE — 92134 CPTRZ OPH DX IMG PST SGM RTA: CPT

## 2025-01-16 ENCOUNTER — NON-APPOINTMENT (OUTPATIENT)
Age: 70
End: 2025-01-16

## 2025-01-16 ENCOUNTER — APPOINTMENT (OUTPATIENT)
Dept: OPHTHALMOLOGY | Facility: CLINIC | Age: 70
End: 2025-01-16
Payer: COMMERCIAL

## 2025-01-16 PROCEDURE — 99024 POSTOP FOLLOW-UP VISIT: CPT

## 2025-08-07 ENCOUNTER — NON-APPOINTMENT (OUTPATIENT)
Age: 70
End: 2025-08-07

## 2025-08-07 ENCOUNTER — APPOINTMENT (OUTPATIENT)
Dept: OPHTHALMOLOGY | Facility: CLINIC | Age: 70
End: 2025-08-07
Payer: COMMERCIAL

## 2025-08-07 PROCEDURE — 92014 COMPRE OPH EXAM EST PT 1/>: CPT

## 2025-08-07 PROCEDURE — 92134 CPTRZ OPH DX IMG PST SGM RTA: CPT

## 2025-08-09 ENCOUNTER — NON-APPOINTMENT (OUTPATIENT)
Age: 70
End: 2025-08-09